# Patient Record
Sex: MALE | Race: WHITE | NOT HISPANIC OR LATINO | URBAN - METROPOLITAN AREA
[De-identification: names, ages, dates, MRNs, and addresses within clinical notes are randomized per-mention and may not be internally consistent; named-entity substitution may affect disease eponyms.]

---

## 2017-10-17 ENCOUNTER — EMERGENCY (EMERGENCY)
Facility: HOSPITAL | Age: 42
LOS: 1 days | Discharge: HOME | End: 2017-10-17
Attending: EMERGENCY MEDICINE | Admitting: EMERGENCY MEDICINE
Payer: COMMERCIAL

## 2017-10-17 VITALS
OXYGEN SATURATION: 97 % | HEIGHT: 68 IN | WEIGHT: 207.01 LBS | DIASTOLIC BLOOD PRESSURE: 88 MMHG | HEART RATE: 70 BPM | TEMPERATURE: 98 F | RESPIRATION RATE: 16 BRPM | SYSTOLIC BLOOD PRESSURE: 140 MMHG

## 2017-10-17 DIAGNOSIS — J32.9 CHRONIC SINUSITIS, UNSPECIFIED: ICD-10-CM

## 2017-10-17 DIAGNOSIS — R51 HEADACHE: ICD-10-CM

## 2017-10-17 DIAGNOSIS — Z87.891 PERSONAL HISTORY OF NICOTINE DEPENDENCE: ICD-10-CM

## 2017-10-17 PROCEDURE — 99284 EMERGENCY DEPT VISIT MOD MDM: CPT

## 2017-10-17 RX ORDER — FLUTICASONE PROPIONATE 50 MCG
1 SPRAY, SUSPENSION NASAL
Qty: 1 | Refills: 0 | OUTPATIENT
Start: 2017-10-17 | End: 2017-10-27

## 2017-10-17 RX ORDER — LORATADINE 10 MG/1
10 TABLET ORAL ONCE
Qty: 0 | Refills: 0 | Status: COMPLETED | OUTPATIENT
Start: 2017-10-17 | End: 2017-10-17

## 2017-10-17 RX ORDER — LORATADINE 10 MG/1
1 TABLET ORAL
Qty: 10 | Refills: 0 | OUTPATIENT
Start: 2017-10-17 | End: 2017-10-27

## 2017-10-17 RX ORDER — IBUPROFEN 200 MG
600 TABLET ORAL ONCE
Qty: 0 | Refills: 0 | Status: COMPLETED | OUTPATIENT
Start: 2017-10-17 | End: 2017-10-17

## 2017-10-17 RX ORDER — SODIUM CHLORIDE 0.65 %
1 AEROSOL, SPRAY (ML) NASAL
Qty: 1 | Refills: 0 | OUTPATIENT
Start: 2017-10-17

## 2017-10-17 RX ADMIN — Medication 600 MILLIGRAM(S): at 19:40

## 2017-10-17 RX ADMIN — Medication 60 MILLIGRAM(S): at 19:34

## 2017-10-17 RX ADMIN — Medication 1 TABLET(S): at 19:34

## 2017-10-17 RX ADMIN — Medication 600 MILLIGRAM(S): at 19:34

## 2017-10-17 NOTE — ED PROVIDER NOTE - OBJECTIVE STATEMENT
43 yo male presents with sinus congestion, runny nose, sinus pain x 3 days, no fever, no cough, no sore throat.  no hx of sinus problems, or sinus surgeries.  No PMD  nonsmoker

## 2017-10-17 NOTE — ED PROVIDER NOTE - PROGRESS NOTE DETAILS
rx for augmentin, flonase, prednisone, claritin and simply saline sent to pharmacy, advised follow up with ENT

## 2017-10-17 NOTE — ED PROVIDER NOTE - ATTENDING CONTRIBUTION TO CARE
Pt is a 43 yo male who presents to the ED with a cc of sinus pressure and congestion.  Pt denies significant past medical history.  He reports that for the last 3 days he has been experiencing left facial pressure with nasal congestion and watery discharge from his left eye.  Pt report that when he is able to relieve some of his nasal congestion the mucus appears to be yellow in color.  He further reports some pressure behind his left eye.  Denies fever, chills, N/V/D/C, CP, SOB, cough, abd pain, ext numbness or weakness.  Pt has no history of frequent sinus infections.  On exam pt resting comfortably in bed NAD, mild facial swelling noted to left maxillary region with allergic shiner noted to left eye and watery discharge.  Inflamed nasal mucosa noted bilaterally.  TMs with effusion but no active infection noted. No exudate noted to tonsilar region but mild erythema to throat.  Heart RRR, lungs CTA, abd soft NT/ND.  Agree with above plan of care.  Will treat pt with po medications, nasal spray, and decongestant.  Pt did recent stop smoking and was advised to not resume as this pre-disposes him to frequent sinus infections.

## 2017-10-27 ENCOUNTER — EMERGENCY (EMERGENCY)
Facility: HOSPITAL | Age: 42
LOS: 1 days | Discharge: HOME | End: 2017-10-27
Attending: EMERGENCY MEDICINE | Admitting: EMERGENCY MEDICINE
Payer: COMMERCIAL

## 2017-10-27 VITALS
RESPIRATION RATE: 16 BRPM | HEART RATE: 82 BPM | SYSTOLIC BLOOD PRESSURE: 152 MMHG | DIASTOLIC BLOOD PRESSURE: 85 MMHG | TEMPERATURE: 98 F | WEIGHT: 210.1 LBS | OXYGEN SATURATION: 96 % | HEIGHT: 68 IN

## 2017-10-27 VITALS
TEMPERATURE: 98 F | RESPIRATION RATE: 14 BRPM | SYSTOLIC BLOOD PRESSURE: 115 MMHG | HEART RATE: 74 BPM | DIASTOLIC BLOOD PRESSURE: 74 MMHG | OXYGEN SATURATION: 97 %

## 2017-10-27 DIAGNOSIS — R21 RASH AND OTHER NONSPECIFIC SKIN ERUPTION: ICD-10-CM

## 2017-10-27 DIAGNOSIS — R13.10 DYSPHAGIA, UNSPECIFIED: ICD-10-CM

## 2017-10-27 PROCEDURE — 96374 THER/PROPH/DIAG INJ IV PUSH: CPT

## 2017-10-27 PROCEDURE — 99284 EMERGENCY DEPT VISIT MOD MDM: CPT

## 2017-10-27 PROCEDURE — 99284 EMERGENCY DEPT VISIT MOD MDM: CPT | Mod: 25

## 2017-10-27 PROCEDURE — 96375 TX/PRO/DX INJ NEW DRUG ADDON: CPT

## 2017-10-27 RX ORDER — SODIUM CHLORIDE 9 MG/ML
1000 INJECTION INTRAMUSCULAR; INTRAVENOUS; SUBCUTANEOUS ONCE
Qty: 0 | Refills: 0 | Status: COMPLETED | OUTPATIENT
Start: 2017-10-27 | End: 2017-10-27

## 2017-10-27 RX ORDER — DIPHENHYDRAMINE HCL 50 MG
50 CAPSULE ORAL ONCE
Qty: 0 | Refills: 0 | Status: COMPLETED | OUTPATIENT
Start: 2017-10-27 | End: 2017-10-27

## 2017-10-27 RX ORDER — DIPHENHYDRAMINE HCL 50 MG
2 CAPSULE ORAL
Qty: 24 | Refills: 0 | OUTPATIENT
Start: 2017-10-27 | End: 2017-10-30

## 2017-10-27 RX ORDER — FAMOTIDINE 10 MG/ML
20 INJECTION INTRAVENOUS ONCE
Qty: 0 | Refills: 0 | Status: COMPLETED | OUTPATIENT
Start: 2017-10-27 | End: 2017-10-27

## 2017-10-27 RX ADMIN — SODIUM CHLORIDE 2000 MILLILITER(S): 9 INJECTION INTRAMUSCULAR; INTRAVENOUS; SUBCUTANEOUS at 19:51

## 2017-10-27 RX ADMIN — Medication 50 MILLIGRAM(S): at 19:52

## 2017-10-27 RX ADMIN — Medication 125 MILLIGRAM(S): at 19:51

## 2017-10-27 RX ADMIN — FAMOTIDINE 20 MILLIGRAM(S): 10 INJECTION INTRAVENOUS at 19:51

## 2017-10-27 NOTE — ED ADULT NURSE NOTE - OBJECTIVE STATEMENT
pt reports starting new eye drops on Monday and has been having an allergic reaction progressively getting worse.  facial flushing, eyes tearing, facial edema reports difficulty breathing.  lungs CTA b/l pt reports starting new eye drops on Monday and has been having an allergic reaction progressively getting worse.  facial flushing, eyes tearing, facial edema reports difficulty breathing.  lungs CTA b/l.

## 2017-10-27 NOTE — ED PROVIDER NOTE - OBJECTIVE STATEMENT
Came in ambulatory state 2 weeks ago seen in our ER with nasal congestion possible allergic reaction state given a prescription but did not pick it up because the following days feels much better. 3 days ago start having some eye irritation saw an opthalmology given some eye drops notice to have some increase eye irritation increase nasal congestion today notice swelling and redness around the lips.

## 2020-08-07 NOTE — ED PROVIDER NOTE - QUALITY
dull
Additional Notes: Patient consent was obtained to proceed with the visit and recommended plan of care after discussion of all risks and benefits, including the risks of COVID-19 exposure.
Detail Level: Simple
